# Patient Record
Sex: FEMALE | Race: WHITE | Employment: UNEMPLOYED | URBAN - METROPOLITAN AREA
[De-identification: names, ages, dates, MRNs, and addresses within clinical notes are randomized per-mention and may not be internally consistent; named-entity substitution may affect disease eponyms.]

---

## 2017-02-13 ENCOUNTER — HOSPITAL ENCOUNTER (OUTPATIENT)
Dept: ENDOSCOPY | Age: 67
Discharge: HOME | End: 2017-02-13
Attending: INTERNAL MEDICINE | Admitting: INTERNAL MEDICINE

## 2020-11-01 ENCOUNTER — HOSPITAL ENCOUNTER (EMERGENCY)
Age: 70
Discharge: HOME | End: 2020-11-01
Attending: EMERGENCY MEDICINE
Payer: MEDICARE

## 2020-11-01 ENCOUNTER — APPOINTMENT (OUTPATIENT)
Dept: CT IMAGING | Age: 70
End: 2020-11-01
Payer: MEDICARE

## 2020-11-01 VITALS
DIASTOLIC BLOOD PRESSURE: 49 MMHG | WEIGHT: 134 LBS | TEMPERATURE: 98.5 F | OXYGEN SATURATION: 97 % | RESPIRATION RATE: 20 BRPM | HEART RATE: 73 BPM | SYSTOLIC BLOOD PRESSURE: 115 MMHG | BODY MASS INDEX: 21.03 KG/M2 | HEIGHT: 67 IN

## 2020-11-01 LAB
A/G RATIO: 1.9 (ref 1.1–2.2)
ALBUMIN SERPL-MCNC: 4.5 G/DL (ref 3.4–5)
ALP BLD-CCNC: 81 U/L (ref 40–129)
ALT SERPL-CCNC: 11 U/L (ref 10–40)
ANION GAP SERPL CALCULATED.3IONS-SCNC: 12 MMOL/L (ref 3–16)
AST SERPL-CCNC: 19 U/L (ref 15–37)
BASOPHILS ABSOLUTE: 0 K/UL (ref 0–0.2)
BASOPHILS RELATIVE PERCENT: 0.6 %
BILIRUB SERPL-MCNC: 0.3 MG/DL (ref 0–1)
BILIRUBIN URINE: NEGATIVE
BLOOD, URINE: ABNORMAL
BUN BLDV-MCNC: 11 MG/DL (ref 7–20)
CALCIUM SERPL-MCNC: 9.6 MG/DL (ref 8.3–10.6)
CHLORIDE BLD-SCNC: 106 MMOL/L (ref 99–110)
CLARITY: CLEAR
CO2: 22 MMOL/L (ref 21–32)
COLOR: YELLOW
CREAT SERPL-MCNC: <0.5 MG/DL (ref 0.6–1.2)
EOSINOPHILS ABSOLUTE: 0 K/UL (ref 0–0.6)
EOSINOPHILS RELATIVE PERCENT: 0.2 %
EPITHELIAL CELLS, UA: 0 /HPF (ref 0–5)
GFR AFRICAN AMERICAN: >60
GFR NON-AFRICAN AMERICAN: >60
GLOBULIN: 2.4 G/DL
GLUCOSE BLD-MCNC: 121 MG/DL (ref 70–99)
GLUCOSE URINE: NEGATIVE MG/DL
HCT VFR BLD CALC: 40.3 % (ref 36–48)
HEMOGLOBIN: 13.5 G/DL (ref 12–16)
HYALINE CASTS: 1 /LPF (ref 0–8)
KETONES, URINE: ABNORMAL MG/DL
LEUKOCYTE ESTERASE, URINE: ABNORMAL
LIPASE: 43 U/L (ref 13–60)
LYMPHOCYTES ABSOLUTE: 0.5 K/UL (ref 1–5.1)
LYMPHOCYTES RELATIVE PERCENT: 12 %
MCH RBC QN AUTO: 33.1 PG (ref 26–34)
MCHC RBC AUTO-ENTMCNC: 33.4 G/DL (ref 31–36)
MCV RBC AUTO: 98.9 FL (ref 80–100)
MICROSCOPIC EXAMINATION: YES
MONOCYTES ABSOLUTE: 0.3 K/UL (ref 0–1.3)
MONOCYTES RELATIVE PERCENT: 5.8 %
NEUTROPHILS ABSOLUTE: 3.7 K/UL (ref 1.7–7.7)
NEUTROPHILS RELATIVE PERCENT: 81.4 %
NITRITE, URINE: NEGATIVE
PDW BLD-RTO: 13 % (ref 12.4–15.4)
PH UA: 5.5 (ref 5–8)
PLATELET # BLD: 140 K/UL (ref 135–450)
PMV BLD AUTO: 7.5 FL (ref 5–10.5)
POTASSIUM REFLEX MAGNESIUM: 4.1 MMOL/L (ref 3.5–5.1)
PROTEIN UA: NEGATIVE MG/DL
RBC # BLD: 4.07 M/UL (ref 4–5.2)
RBC UA: 2 /HPF (ref 0–4)
SODIUM BLD-SCNC: 140 MMOL/L (ref 136–145)
SPECIFIC GRAVITY UA: 1.01 (ref 1–1.03)
TOTAL PROTEIN: 6.9 G/DL (ref 6.4–8.2)
TROPONIN: <0.01 NG/ML
URINE REFLEX TO CULTURE: ABNORMAL
URINE TYPE: ABNORMAL
UROBILINOGEN, URINE: 0.2 E.U./DL
WBC # BLD: 4.5 K/UL (ref 4–11)
WBC UA: 3 /HPF (ref 0–5)

## 2020-11-01 PROCEDURE — 6360000002 HC RX W HCPCS: Performed by: NURSE PRACTITIONER

## 2020-11-01 PROCEDURE — 99284 EMERGENCY DEPT VISIT MOD MDM: CPT

## 2020-11-01 PROCEDURE — 2580000003 HC RX 258: Performed by: NURSE PRACTITIONER

## 2020-11-01 PROCEDURE — 93005 ELECTROCARDIOGRAM TRACING: CPT | Performed by: EMERGENCY MEDICINE

## 2020-11-01 PROCEDURE — 80053 COMPREHEN METABOLIC PANEL: CPT

## 2020-11-01 PROCEDURE — 85025 COMPLETE CBC W/AUTO DIFF WBC: CPT

## 2020-11-01 PROCEDURE — 6370000000 HC RX 637 (ALT 250 FOR IP): Performed by: NURSE PRACTITIONER

## 2020-11-01 PROCEDURE — 81001 URINALYSIS AUTO W/SCOPE: CPT

## 2020-11-01 PROCEDURE — 96376 TX/PRO/DX INJ SAME DRUG ADON: CPT

## 2020-11-01 PROCEDURE — 96374 THER/PROPH/DIAG INJ IV PUSH: CPT

## 2020-11-01 PROCEDURE — 6360000004 HC RX CONTRAST MEDICATION: Performed by: NURSE PRACTITIONER

## 2020-11-01 PROCEDURE — 74177 CT ABD & PELVIS W/CONTRAST: CPT

## 2020-11-01 PROCEDURE — 83690 ASSAY OF LIPASE: CPT

## 2020-11-01 PROCEDURE — 96375 TX/PRO/DX INJ NEW DRUG ADDON: CPT

## 2020-11-01 PROCEDURE — 96372 THER/PROPH/DIAG INJ SC/IM: CPT

## 2020-11-01 PROCEDURE — 84484 ASSAY OF TROPONIN QUANT: CPT

## 2020-11-01 RX ORDER — DICYCLOMINE HYDROCHLORIDE 10 MG/1
10 CAPSULE ORAL
Qty: 20 CAPSULE | Refills: 0 | Status: SHIPPED | OUTPATIENT
Start: 2020-11-01 | End: 2020-11-06

## 2020-11-01 RX ORDER — PROMETHAZINE HYDROCHLORIDE 25 MG/ML
12.5 INJECTION, SOLUTION INTRAMUSCULAR; INTRAVENOUS ONCE
Status: COMPLETED | OUTPATIENT
Start: 2020-11-01 | End: 2020-11-01

## 2020-11-01 RX ORDER — POLYETHYLENE GLYCOL 3350 17 G/17G
17 POWDER, FOR SOLUTION ORAL DAILY
Qty: 510 G | Refills: 0 | Status: SHIPPED | OUTPATIENT
Start: 2020-11-01 | End: 2020-12-01

## 2020-11-01 RX ORDER — MORPHINE SULFATE 4 MG/ML
4 INJECTION, SOLUTION INTRAMUSCULAR; INTRAVENOUS ONCE
Status: COMPLETED | OUTPATIENT
Start: 2020-11-01 | End: 2020-11-01

## 2020-11-01 RX ORDER — ONDANSETRON 2 MG/ML
4 INJECTION INTRAMUSCULAR; INTRAVENOUS ONCE
Status: COMPLETED | OUTPATIENT
Start: 2020-11-01 | End: 2020-11-01

## 2020-11-01 RX ORDER — 0.9 % SODIUM CHLORIDE 0.9 %
1000 INTRAVENOUS SOLUTION INTRAVENOUS ONCE
Status: COMPLETED | OUTPATIENT
Start: 2020-11-01 | End: 2020-11-01

## 2020-11-01 RX ORDER — DICYCLOMINE HYDROCHLORIDE 10 MG/1
10 CAPSULE ORAL
Qty: 20 CAPSULE | Refills: 0 | Status: SHIPPED | OUTPATIENT
Start: 2020-11-01 | End: 2020-11-01 | Stop reason: CLARIF

## 2020-11-01 RX ORDER — MAGNESIUM CARB/ALUMINUM HYDROX 105-160MG
296 TABLET,CHEWABLE ORAL ONCE
Qty: 1 BOTTLE | Refills: 0 | Status: SHIPPED | OUTPATIENT
Start: 2020-11-01 | End: 2020-11-01

## 2020-11-01 RX ORDER — POLYETHYLENE GLYCOL 3350 17 G/17G
17 POWDER, FOR SOLUTION ORAL DAILY
Qty: 510 G | Refills: 0 | Status: SHIPPED | OUTPATIENT
Start: 2020-11-01 | End: 2020-11-01 | Stop reason: CLARIF

## 2020-11-01 RX ORDER — PROMETHAZINE HYDROCHLORIDE 25 MG/1
25 TABLET ORAL EVERY 6 HOURS PRN
Qty: 20 TABLET | Refills: 0 | Status: SHIPPED | OUTPATIENT
Start: 2020-11-01

## 2020-11-01 RX ADMIN — LIDOCAINE HYDROCHLORIDE: 20 SOLUTION ORAL; TOPICAL at 15:08

## 2020-11-01 RX ADMIN — MORPHINE SULFATE 4 MG: 4 INJECTION INTRAVENOUS at 15:14

## 2020-11-01 RX ADMIN — ONDANSETRON 4 MG: 2 INJECTION INTRAMUSCULAR; INTRAVENOUS at 17:23

## 2020-11-01 RX ADMIN — PROMETHAZINE HYDROCHLORIDE 12.5 MG: 25 INJECTION INTRAMUSCULAR; INTRAVENOUS at 18:11

## 2020-11-01 RX ADMIN — ONDANSETRON 4 MG: 2 INJECTION INTRAMUSCULAR; INTRAVENOUS at 15:13

## 2020-11-01 RX ADMIN — SODIUM CHLORIDE 1000 ML: 9 INJECTION, SOLUTION INTRAVENOUS at 15:13

## 2020-11-01 ASSESSMENT — PAIN SCALES - GENERAL
PAINLEVEL_OUTOF10: 8
PAINLEVEL_OUTOF10: 8

## 2020-11-01 NOTE — ED PROVIDER NOTES
[address] [facility]        Pt Name: Martin Strickland  MRN: [id number]  [place] 1950  Date of evaluation: 11/1/2020  Provider: LEILA Porter - CNP  PCP: Nathalie Laurent MD     I have seen and evaluated this patient with my supervising physician. Dr. Nancy Crockett       Chief Complaint   Patient presents with    Abdominal Pain     pt in with c/o abd pain mid line- began four days ago- no n/v- some diarhhea- history of hernia, ulcer, colon cancer       HISTORY OF PRESENT ILLNESS   (Location, Timing/Onset, Context/Setting, Quality, Duration, Modifying Factors, Severity, Associated Signs and Symptoms)  Note limiting factors. Martin Strickland is a 71 y.o. female medical history of GERD, hiatal hernia and colon cancer presents the ED with complaints of periumbilical abdominal pain that has been a constant sharp burning pain since 6 AM this morning. Patient said the pain awoke her from her sleep and has not gone away. She was concerned as she took a Protonix as she thought was possibly related to her GERD. Said the GERD usually does not last this long and is not this severe. She did note a few episodes of diarrhea over the past 3 to 4 days with nausea and decreased p.o. intake. Said she felt like she had to force herself to eat a piece of toast this morning and has not ate anything else. She did note some urinary frequency and urgency, although denies dysuria and hematuria. She has a history of colon cancer approximately 40 years ago and had surgery and is not currently getting any chemo or radiation. She does note to have routine colonoscopies and had the last one in July 2020 and was noted with some diverticulosis but no concern for malignancy. She denies any associated fevers, cough, chest pain, vomiting, weakness, lightheaded, dizzy, leg swelling or headaches.     Nursing Notes were all reviewed and agreed with or any disagreements were addressed in the HPI. REVIEW OF SYSTEMS    (2-9 systems for level 4, 10 or more for level 5)     Review of Systems    Positives and Pertinent negatives as per HPI. Except as noted above in the ROS, all other systems were reviewed and negative. PAST MEDICAL HISTORY   History reviewed. No pertinent past medical history. SURGICAL HISTORY   History reviewed. No pertinent surgical history. [address]       Discharge Medication List as of 11/1/2020  8:08 PM      CONTINUE these medications which have NOT CHANGED    Details   acetaminophen (TYLENOL) 500 MG tablet Take 500 mg by mouth every 6 hours as needed for Pain      vitamin D (CHOLECALCIFEROL) 1000 UNIT TABS tablet Take 1,000 Units by mouth daily      cetirizine (ZYRTEC) 10 MG tablet Take 10 mg by mouth daily      methylPREDNISolone (MEDROL DOSEPACK) 4 MG tablet Take as directed, Disp-21 tablet,R-0Normal               ALLERGIES     Ciprofloxacin; Oxycodone-acetaminophen; Influenza a (h1n1) monovalent vaccine; Risedronate; and Sulfa antibiotics    FAMILYHISTORY     History reviewed. No pertinent family history. SOCIAL HISTORY       Social History     Tobacco Use    Smoking status: Never Smoker    Smokeless tobacco: Never Used   Substance Use Topics    Alcohol use: Not Currently     Alcohol/week: 0.0 standard drinks    Drug use: Not Currently       SCREENINGS             PHYSICAL EXAM    (up to 7 for level 4, 8 or more for level 5)     ED Triage Vitals [11/01/20 1238]   BP Temp Temp Source Pulse Resp SpO2 Height Weight   (!) 122/54 98.5 °F (36.9 °C) Oral 89 12 98 % 5' 7\" (1.702 m) 134 lb (60.8 kg)       Physical Exam  Vitals signs and nursing note reviewed. Constitutional:       General: She is awake. Appearance: Normal appearance. She is well-developed and normal weight. HENT:      Head: Normocephalic and atraumatic.       Nose: Nose normal.   Eyes:      General:   [address],  [place], [place] [facility]   Phone [phone]   TROPONIN    Narrative:     Performed at:  [facility]  [address] [place]   Phone [phone]   MICROSCOPIC URINALYSIS    Narrative:     Performed at:  [facility]  [address] [facility],  [place], [place]   Phone [phone]       All other labs were within normal range or not returned as of this dictation. EKG: All EKG's are interpreted by the Emergency Department Physician in the absence of a cardiologist.  Please see their note for interpretation of EKG. RADIOLOGY:   Non-plain film images such as CT, Ultrasound and MRI are read by the radiologist. Plain radiographic images are visualized and preliminarily interpreted by the ED Provider with the below findings:        Interpretation per the Radiologist below, if available at the time of this note:    CT ABDOMEN PELVIS W IV CONTRAST Additional Contrast? None   Final Result   1. No acute findings within the abdomen or pelvis. No evidence of small   bowel obstruction. Gas and stool throughout the colon with no acute   inflammatory changes. 2. 1.2 x 2.0 cm right hepatic lesion, likely a cavernous hemangioma. If   outside comparison imaging is available, this would be helpful to determine   stability. An addendum can be added at that time. 3. Nonobstructive bilateral nephrolithiasis. No results found.         PROCEDURES   Unless otherwise noted below, none     Procedures    CRITICAL CARE TIME   N/A    CONSULTS:  None      EMERGENCY DEPARTMENT COURSE and DIFFERENTIAL DIAGNOSIS/MDM:   Vitals:    Vitals:    11/01/20 1915 11/01/20 1930 11/01/20 1945 11/01/20 2000   BP:  (!) 119/55  (!) 115/49   Pulse: 74 72 71 73   Resp:    20   Temp:       TempSrc:       SpO2: 98% 97% 100% 97%   Weight:       Height:           Patient was given the following medications:  Medications   0.9 % sodium chloride bolus (0 mLs Intravenous Stopped 11/1/20 1724)   ondansetron (ZOFRAN) injection 4 mg (4 mg Intravenous Given 11/1/20 1513)   morphine injection 4 mg (4 mg Intravenous Given 11/1/20 1514)   aluminum & magnesium hydroxide-simethicone (MAALOX) 30 mL, lidocaine viscous hcl (XYLOCAINE) 5 mL (GI COCKTAIL) ( Oral Given 11/1/20 1508)   ondansetron (ZOFRAN) injection 4 mg (4 mg Intravenous Given 11/1/20 1723)   promethazine (PHENERGAN) injection 12.5 mg (12.5 mg Intramuscular Given 11/1/20 1811)             Pertinent Labs & Imaging studies reviewed. (See chart for details)   -  Patient seen and evaluated in the emergency department. -  Triage and nursing notes reviewed and incorporated. -  Old chart records reviewed and incorporated. -  Patient case discussed with attending physician, Dr. Merlyn Bolivar. They saw and examined patient. -  Differential diagnosis includes:  Diverticulitis, SBO, gastritis, UTI, dehydration, mesenteric adenitis, cholecystitis, esophagitis, vs COVID-19  -  Work-up included:  See above EKG, CTM pelvis, CBC, CMP, lipase, troponin, UA  -  ED treatment included:   Phenergan, Zofran, normal saline, morphine, GI cocktail  - Consults: None  -  Results discussed with patient. Labs show  UA shows trace ketones, small blood, trace leukocytes. CBC is unremarkable. CMP with glucose 121. Lipase 43. Troponin negative. CT abdomen pelvis shows no acute findings within the abdomen or pelvis. No evidence of small bowel obstruction. Gas and stool throughout the colon with no acute inflammatory changes. There is noted a 1.2 x 2 cm right hepatic lesion likely a cavernous hemangioma. CT scan from [facility] 2017 does confirm similar findings. Patient was informed on these results. Said that she has been taking MiraLAX occasionally, however had not been taking it on a daily basis. She did note having 5 bowel movements 2 days ago and did not think that she was possibly constipated.  Pt was given strict return precautions. The patient is agreeable with plan of care and disposition.  -  Disposition: Home      FINAL IMPRESSION      1. Periumbilical abdominal cramping    2. Constipation, unspecified constipation type    3. Hepatic lesion    4.  Uterine leiomyoma, unspecified location          DISPOSITION/PLAN   DISPOSITION        PATIENT REFERREDTO:  Chaitanya Ramírez [phone] [address] [address]    Call in 2 days  As needed, If symptoms worsen    [facility] Emergency Department  [address] [facility]  [phone]  Go to   As needed      DISCHARGE MEDICATIONS:  Discharge Medication List as of 11/1/2020  8:08 PM      START taking these medications    Details   dicyclomine (BENTYL) 10 MG capsule Take 1 capsule by mouth 4 times daily (before meals and nightly) for 20 doses, Disp-20 capsule,R-0Print      promethazine (PHENERGAN) 25 MG tablet Take 1 tablet by mouth every 6 hours as needed for Nausea, Disp-20 tablet,R-0Print      polyethylene glycol (GLYCOLAX) 17 GM/SCOOP powder Take 17 g by mouth daily, Disp-510 g,R-0Print      Magnesium Citrate 1.745 GM/30ML solution Take 296 mLs by mouth once for 1 dose, Disp-1 Bottle,R-0Print             DISCONTINUED MEDICATIONS:  Discharge Medication List as of 11/1/2020  8:08 PM                 (Please note that portions of this note were completed with a voice recognition program.  Efforts were made to edit the dictations but occasionally words are mis-transcribed.)    LEILA Bryant CNP (electronically signed)            LEILA Bryant CNP  11/02/20 4107

## 2020-11-02 LAB
EKG ATRIAL RATE: 75 BPM
EKG DIAGNOSIS: NORMAL
EKG P AXIS: 69 DEGREES
EKG P-R INTERVAL: 136 MS
EKG Q-T INTERVAL: 410 MS
EKG QRS DURATION: 92 MS
EKG QTC CALCULATION (BAZETT): 457 MS
EKG R AXIS: -23 DEGREES
EKG T AXIS: 49 DEGREES
EKG VENTRICULAR RATE: 75 BPM

## 2020-11-02 PROCEDURE — 93010 ELECTROCARDIOGRAM REPORT: CPT | Performed by: INTERNAL MEDICINE

## 2020-11-02 NOTE — ED NOTES
Pt attempting to drink 2 glasses of water - no vomiting noted. Resting at present. Call bell in reach.       Kt Buckley RN  11/01/20 1935

## 2020-11-02 NOTE — ED NOTES
Pt tolerated po fluids. No nausea or vomiting noted. DC instructions given- DC home with family. No complaints voiced.      Kristy Espinal RN  11/01/20 2021

## 2020-11-03 NOTE — ED PROVIDER NOTES
I independently performed a history and physical on Joseph Hernandez. All diagnostic, treatment, and disposition decisions were made by myself in conjunction with the advanced practice provider. Briefly, this is a 71 y.o. female here for crampy lower abdominal pain. Associated with nausea which patient states is chronic issue for her form hiatal hernia however worsened today. Denies any vomiting. Symptoms improved modestly after bowel movements. .    On exam, Patient afebrile and nontoxic. No distress. Heart RRR. Lungs CTAB. Abdomen soft, nondistended, tender to palpation in LLQ and inferior periumbilical region. No focal RLQ tenderness or Rovsing. No rebound, no rigidity, no guarding. EKG  EKG was reviewed by emergency department physician in the absence of a cardiologist    Narrow complex sinus rhythm, rate 75, normal axis, normal ME and QRS intervals, normal Qtc, no ST elevations or depressions, normal t-wave morphology, impression NSR, no STEMI, no comparison available      Screenings            MDM    Patient afebrile and nontoxic. No distress. No peritoneal signs on abdominal exam. No focal findings to suggest acute appendicitis. Cardiac workup reassuring, low suspicion for ACS as cause of lower abdominal pain. CT imaging without evidence of obstruction, perforation or other acute process. There is a hepatic lesion on CT, possibly cavernous hemangioma and also present on imaging from 2017 Patient does appear to have significant stool burden which is likely contributing to her pain. No impaction. Was able to tolerate PO in the ED. [place] safe for discharge to self care and will trial bowel regimen. Counseled on importance of close PCP follow up for her abdominal pain and liver lesion within the next couple days. Strict return precautions were discussed.      Patient Referrals:  Chaitanya Ramírez [phone] [employer] [address]    Call in 2 days  As needed, If symptoms worsen    [facility] [address] Emergency Department  [facility] [address]  [phone]  Go to   As needed      Discharge Medications:  Discharge Medication List as of 11/1/2020  8:08 PM      START taking these medications    Details   dicyclomine (BENTYL) 10 MG capsule Take 1 capsule by mouth 4 times daily (before meals and nightly) for 20 doses, Disp-20 capsule,R-0Print      promethazine (PHENERGAN) 25 MG tablet Take 1 tablet by mouth every 6 hours as needed for Nausea, Disp-20 tablet,R-0Print      polyethylene glycol (GLYCOLAX) 17 GM/SCOOP powder Take 17 g by mouth daily, Disp-510 g,R-0Print      Magnesium Citrate 1.745 GM/30ML solution Take 296 mLs by mouth once for 1 dose, Disp-1 Bottle,R-0Print             FINAL IMPRESSION  1. Periumbilical abdominal cramping    2. Constipation, unspecified constipation type    3. Hepatic lesion    4. Uterine leiomyoma, unspecified location        Blood pressure (!) 115/49, pulse 73, temperature 98.5 °F (36.9 °C), temperature source Oral, resp. rate 20, height 5' 7\" (1.702 m), weight 134 lb (60.8 kg), SpO2 97 %. For further details of Fariba Clay's emergency department encounter, please see documentation by advanced practice provider, Pola Jaime NP.     Ady Tillamn DO (electronically signed)  Attending Emergency Physician       Ady Tillman DO  11/03/20 2902

## 2023-10-25 RX ORDER — CALCIUM CARBONATE 300MG(750)
400 TABLET,CHEWABLE ORAL DAILY
COMMUNITY

## 2023-10-25 RX ORDER — ASCORBIC ACID 250 MG
500 TABLET ORAL DAILY
COMMUNITY

## 2023-10-25 RX ORDER — VIT C/B6/B5/MAGNESIUM/HERB 173 50-5-6-5MG
500 CAPSULE ORAL DAILY
COMMUNITY

## 2023-10-25 NOTE — PROGRESS NOTES
[facility] ENDOSCOPY COLONOSCOPY/EGD PRE-OPERATIVE INSTRUCTIONS    Procedure date__10/31/2023_______  Arrival time____0630________          Surgery time___0730_________       Clear liquids the day before the procedure. Do not eat or drink anything within 5 hours of your procedure. This includes water chewing gum, mints and ice chips. You may brush your teeth and gargle the morning of your surgery, but do not swallow the water    You may be asked to stop blood thinners such as Coumadin, Plavix, Fragmin, Lovenox, etc., or any anti-inflammatories such as:  Aspirin, Ibuprofen, Advil, Naproxen prior to your procedure. We also ask that you stop any OTC medications such as fish oil, vitamin E, glucosamine, garlic, Multivitamins, COQ 10, etc.    You must make arrangements for a responsible adult to arrive with you and stay in our waiting area during your procedure. They will also need to take you home after your procedure. For your safety you will not be allowed to leave alone or drive yourself home. Also for your safety, it is strongly suggested that someone stay with you the first 24 hours after your procedure. For your comfort, please wear simple loose fitting clothing to the center. Please do not bring valuables. If you have a living will and a durable power of  for healthcare, please bring in a copy.      You will need to bring a photo ID and insurance card    Our goal is to provide you with excellent care so if you have any questions, please contact us at the [facility] at [phone]         Please note these are generalized instructions for all colonoscopy cases, you may be provided with more specific instructions if necessary

## 2023-10-30 ENCOUNTER — ANESTHESIA EVENT (OUTPATIENT)
Dept: ENDOSCOPY | Age: 73
End: 2023-10-30
Payer: MEDICARE

## 2023-10-31 ENCOUNTER — ANESTHESIA (OUTPATIENT)
Dept: ENDOSCOPY | Age: 73
End: 2023-10-31
Payer: MEDICARE

## 2023-10-31 ENCOUNTER — ANESTHESIA EVENT (OUTPATIENT)
Dept: ENDOSCOPY | Age: 73
End: 2023-10-31
Payer: MEDICARE

## 2023-10-31 ENCOUNTER — HOSPITAL ENCOUNTER (OUTPATIENT)
Age: 73
Setting detail: OUTPATIENT SURGERY
Discharge: HOME | End: 2023-10-31
Attending: INTERNAL MEDICINE | Admitting: INTERNAL MEDICINE
Payer: MEDICARE

## 2023-10-31 VITALS
OXYGEN SATURATION: 99 % | WEIGHT: 125 LBS | TEMPERATURE: 98.1 F | RESPIRATION RATE: 16 BRPM | BODY MASS INDEX: 20.09 KG/M2 | HEART RATE: 73 BPM | HEIGHT: 66 IN | SYSTOLIC BLOOD PRESSURE: 130 MMHG | DIASTOLIC BLOOD PRESSURE: 42 MMHG

## 2023-10-31 DIAGNOSIS — R19.5 POSITIVE FIT (FECAL IMMUNOCHEMICAL TEST): ICD-10-CM

## 2023-10-31 DIAGNOSIS — K21.9 GASTROESOPHAGEAL REFLUX DISEASE, UNSPECIFIED WHETHER ESOPHAGITIS PRESENT: ICD-10-CM

## 2023-10-31 DIAGNOSIS — Z85.038 HISTORY OF COLON CANCER: ICD-10-CM

## 2023-10-31 PROCEDURE — 3609017100 HC EGD: Performed by: INTERNAL MEDICINE

## 2023-10-31 PROCEDURE — 7100000010 HC PHASE II RECOVERY - FIRST 15 MIN: Performed by: INTERNAL MEDICINE

## 2023-10-31 PROCEDURE — 2709999900 HC NON-CHARGEABLE SUPPLY: Performed by: INTERNAL MEDICINE

## 2023-10-31 PROCEDURE — 88305 TISSUE EXAM BY PATHOLOGIST: CPT

## 2023-10-31 PROCEDURE — 7100000011 HC PHASE II RECOVERY - ADDTL 15 MIN: Performed by: INTERNAL MEDICINE

## 2023-10-31 PROCEDURE — 2580000003 HC RX 258: Performed by: ANESTHESIOLOGY

## 2023-10-31 PROCEDURE — 2500000003 HC RX 250 WO HCPCS: Performed by: NURSE ANESTHETIST, CERTIFIED REGISTERED

## 2023-10-31 PROCEDURE — 3700000001 HC ADD 15 MINUTES (ANESTHESIA): Performed by: INTERNAL MEDICINE

## 2023-10-31 PROCEDURE — 3700000000 HC ANESTHESIA ATTENDED CARE: Performed by: INTERNAL MEDICINE

## 2023-10-31 PROCEDURE — 3609010600 HC COLONOSCOPY POLYPECTOMY SNARE/COLD BIOPSY: Performed by: INTERNAL MEDICINE

## 2023-10-31 PROCEDURE — 6360000002 HC RX W HCPCS: Performed by: NURSE ANESTHETIST, CERTIFIED REGISTERED

## 2023-10-31 RX ORDER — PROPOFOL 10 MG/ML
INJECTION, EMULSION INTRAVENOUS CONTINUOUS PRN
Status: DISCONTINUED | OUTPATIENT
Start: 2023-10-31 | End: 2023-10-31 | Stop reason: SDUPTHER

## 2023-10-31 RX ORDER — SODIUM CHLORIDE 9 MG/ML
INJECTION, SOLUTION INTRAVENOUS PRN
Status: DISCONTINUED | OUTPATIENT
Start: 2023-10-31 | End: 2023-10-31 | Stop reason: HOSPADM

## 2023-10-31 RX ORDER — ONDANSETRON 2 MG/ML
4 INJECTION INTRAMUSCULAR; INTRAVENOUS
Status: CANCELLED | OUTPATIENT
Start: 2023-10-31 | End: 2023-11-01

## 2023-10-31 RX ORDER — SODIUM CHLORIDE 0.9 % (FLUSH) 0.9 %
5-40 SYRINGE (ML) INJECTION PRN
Status: CANCELLED | OUTPATIENT
Start: 2023-10-31

## 2023-10-31 RX ORDER — SODIUM CHLORIDE 0.9 % (FLUSH) 0.9 %
5-40 SYRINGE (ML) INJECTION PRN
Status: DISCONTINUED | OUTPATIENT
Start: 2023-10-31 | End: 2023-10-31 | Stop reason: HOSPADM

## 2023-10-31 RX ORDER — DIPHENHYDRAMINE HYDROCHLORIDE 50 MG/ML
12.5 INJECTION INTRAMUSCULAR; INTRAVENOUS
Status: CANCELLED | OUTPATIENT
Start: 2023-10-31 | End: 2023-11-01

## 2023-10-31 RX ORDER — SODIUM CHLORIDE 0.9 % (FLUSH) 0.9 %
5-40 SYRINGE (ML) INJECTION EVERY 12 HOURS SCHEDULED
Status: DISCONTINUED | OUTPATIENT
Start: 2023-10-31 | End: 2023-10-31 | Stop reason: HOSPADM

## 2023-10-31 RX ORDER — SODIUM CHLORIDE 9 MG/ML
INJECTION, SOLUTION INTRAVENOUS PRN
Status: CANCELLED | OUTPATIENT
Start: 2023-10-31

## 2023-10-31 RX ORDER — SODIUM CHLORIDE 0.9 % (FLUSH) 0.9 %
5-40 SYRINGE (ML) INJECTION EVERY 12 HOURS SCHEDULED
Status: CANCELLED | OUTPATIENT
Start: 2023-10-31

## 2023-10-31 RX ORDER — LIDOCAINE HYDROCHLORIDE 20 MG/ML
INJECTION, SOLUTION EPIDURAL; INFILTRATION; INTRACAUDAL; PERINEURAL PRN
Status: DISCONTINUED | OUTPATIENT
Start: 2023-10-31 | End: 2023-10-31 | Stop reason: SDUPTHER

## 2023-10-31 RX ADMIN — SODIUM CHLORIDE: 9 INJECTION, SOLUTION INTRAVENOUS at 07:14

## 2023-10-31 RX ADMIN — LIDOCAINE HYDROCHLORIDE 50 MG: 20 INJECTION, SOLUTION EPIDURAL; INFILTRATION; INTRACAUDAL; PERINEURAL at 07:40

## 2023-10-31 RX ADMIN — PROPOFOL 120 MCG/KG/MIN: 10 INJECTION, EMULSION INTRAVENOUS at 07:40

## 2023-10-31 ASSESSMENT — PAIN SCALES - GENERAL
PAINLEVEL_OUTOF10: 0
PAINLEVEL_OUTOF10: 0

## 2023-10-31 ASSESSMENT — PAIN - FUNCTIONAL ASSESSMENT: PAIN_FUNCTIONAL_ASSESSMENT: NONE - DENIES PAIN

## 2023-10-31 ASSESSMENT — LIFESTYLE VARIABLES: SMOKING_STATUS: 0

## 2023-10-31 NOTE — ANESTHESIA POSTPROCEDURE EVALUATION
Department of Anesthesiology  Postprocedure Note    Patient: Heidi Mosqueda  MRN: [id number]  [address] [facility] [place]: 1950  Date of evaluation: 10/31/2023      Procedure Summary       Date: 10/31/23 Room / Location: [address] [place] [address]    Anesthesia Start: 2837 Anesthesia Stop: 0805    Procedures:       COLONOSCOPY POLYPECTOMY SNARE/COLD BIOPSY      EGD ESOPHAGOGASTRODUODENOSCOPY Diagnosis:       History of colon cancer      Positive FIT (fecal immunochemical test)      Gastroesophageal reflux disease, unspecified whether esophagitis present      (History of colon cancer [Z85.038])      (Positive FIT (fecal immunochemical test) [R19.5])      (Gastroesophageal reflux disease, unspecified whether esophagitis present [K21.9])    Surgeons: Trina Martins MD Responsible Provider: Markus Pollack MD    Anesthesia Type: MAC ASA Status: 2            Anesthesia Type: No value filed.     Carlos Phase I: Carlos Score: 10    Carlos Phase II: Carlos Score: 10      Anesthesia Post Evaluation    Patient location during evaluation: bedside  Patient participation: complete - patient participated  Level of consciousness: awake and alert  Pain score: 0  Nausea & Vomiting: no nausea  Complications: no  Cardiovascular status: hemodynamically stable  Respiratory status: acceptable  Hydration status: stable  Pain management: adequate

## 2023-10-31 NOTE — PROGRESS NOTES
RN talked to Renny SANDOVAL about scheduling patient for next day. Patient instructed to stay on clear liquid and begin prep this after noon . Patient and  verbalized understanding.

## 2023-10-31 NOTE — DISCHARGE INSTRUCTIONS
Discharge Instructions for Colonoscopy     Colonoscopy is a visual exam of the lining of the large intestine, also called the bowel or colon, with a colonoscope. A colonoscope is a flexible tube with a light and a viewing device. It allows the doctor to view the inside of the colon through a tiny video camera. Colonoscopy is performed for many reasons: unexplained anemia , pain, diarrhea , bloody stools, cancer screening, among many other reasons. Complications from a colonoscopy are rare. Some possible serious complications include perforated bowel (which might require surgery) and bleeding (which could require blood transfusion ). Minor complications include bloating, gas, and cramping that can last for 1-2 days after the procedure. Because air is put into your colon during the procedure, it is normal to pass large amounts of air from your rectum. You may not have a bowel movement for 1-3 days after the procedure. What You Will Need:  Someone to drive you home after the procedure     Steps to Take:  [address] when you get home. Because the sedative will make you drowsy, don't drive, operate machinery, or make important decisions the day of the procedure. Feelings of bloating, gas, or cramping may persist for 24 hours. Diet -  Try sips of water first. If tolerated, resume bland food (scrambled eggs, toast, soup) first.  If tolerated, resume regular diet or the diet recommended by your physician. Do not drink alcohol for 24 hours. Physical Activity -  Ask your doctor when you will be able to return to work. Do not drive, operate heavy machinery, or do activities that require coordination or balance for 24 hours. Otherwise, return to your normal routine as soon as you are comfortable to do so, which is usually the next day after the procedure. Medications - When taking medications, it's important to:    Take your medication as directed, not more, not less, not at a different

## 2023-10-31 NOTE — OP NOTE
EGD PROCEDURE NOTE         Esophagogastroduodenoscopy Procedure Note     Patient: Monica Pompa MRN: [id number]   YOB: 1950 Age: 67 y.o. Sex: female       Admitting Physician: Nick Rodrigez     Primary Care Physician: Naldo Hernandez MD      DATE OF PROCEDURE: 10/31/2023  PROCEDURE: Esophagogastroduodenoscopy  INDICATION: This is a 67y.o. year old female who presents today with FIT positive. ENDOSCOPIST: Nick Rodrigez MD    POSTOPERATIVE DIAGNOSIS:    Negative Egd    PLAN:    Colonoscopy    INFORMED CONSENT:  Informed consent for esophagogastoduodenoscopy was obtained. The benefits and risks including adverse medicine reaction have been explained. The patient's questions were answered and the patient agreed to proceed. ASA:  ASA 2 - Patient with mild systemic disease with no functional limitations    SEDATION: MAC     The patient's vital signs, cardiac status, pulmonary status, abdominal status and mental status were stable for the procedure. The patient's vitals signs and respiratory function as monitored by oxygen saturation were stable throughout    Procedure Details: The Olympus videoendoscope was inserted into the mouth and carefully passed into the esophagus, through the stomach and to the distal duodenum. Antegrade and retrograde examination of the upper gi tract was carefully performed. Findings: The esophagus is normal.  The z-line is distinct without lesions or irregularities. There is no evidence of Lowry's esophagus. The scope easily passed into the stomach. The mucosa of the cardia, fundus, body and antrum of the stomach is normal.  The pylorus is patent and of normal contour. The scope easily advanced into the duodenal bulb and down to the distal duodenum.   Ante-and retrograde exam of the duodenum is normal.    Gastric or Duodenal ulcer present: No    Estimated Blood Loss: None      Signed By: Nick Rodrigez MD

## 2023-10-31 NOTE — FLOWSHEET NOTE
[facility] Pre-Admission Testing Electronic Communication Worksheet for OR/ENDO Procedures        Patient: Sue Stallings    DOS: 11/1/23    Arrival Time: 0900    Surgery Time:1030    Meds to Bed:  [] YES    [x]  NO    Transportation Confirmed: [x] YES    []  NO      History and Physical:  [] YES    [x]  NO  [] N/A  If yes, please list doctor or Urgent Care and date of H&P:     Additional Clearance(Cardiac, Pulmonary, etc):  [] YES    [x]  NO    Pre-Admission Testing Visit:  [] YES    [x]  NO If no, do labs/testing need to be done DOS? [] YES    [x]  NO    Medication Reconciliation Complete:  [x] YES    []  NO        Additional Notes:  Pt. Had colonoscopy at [address] [facility] on 10/31/23  Coming down to [facility] to attempt again- poor prep the first time. Dr. Hali Moyer gave new instructions with gatorade and miralax.                Interview Complete: [x] YES    []  NO          Adriano Harden RN  4:24 PM
such as:  Aspirin, Ibuprofen, Advil, Naproxen prior to your surgery. We also ask that you stop any OTC medications such as fish oil, vitamin E, glucosamine, garlic, Multivitamins, COQ 10, etc.    We ask that you do not smoke 24 hours prior to surgery  We ask that you do not  drink any alcoholic beverages 24 hours prior to surgery     You must make arrangements for a responsible adult to take you home after your surgery. For your safety you will not be allowed to leave alone or drive yourself home. Your surgery will be cancelled if you do not have a ride home. Also for your safety, it is strongly suggested that someone stay with you the first 24 hours after your surgery. Please do not bring other children with you. For your comfort, please wear simple loose fitting clothing to the hospital.  Please do not bring valuables. Do not wear any make-up or nail polish on your fingers or toes. For your safety, please do not wear any jewelry or body piercing's on the day of surgery. All jewelry must be removed. If you have dentures, they will be removed before going to operating room. For your convenience, we will provide you with a container. If you wear contact lenses or glasses, they will be removed, please bring a case for them. As part of our patient safety program to minimize surgical site infections, we ask you to do the following:    Please notify your surgeon if you develop any illness between         now and the day of your surgery. This includes a cough, cold, fever, sore throat, nausea,         or vomiting, and diarrhea, etc.   Please notify your surgeon if you experience dizziness, shortness         of breath or blurred vision between now and the time of your surgery. Do not shave your operative site 96 hours prior to surgery. For face and neck surgery, men may use an electric razor 48 hours   prior to surgery.     You may shower the night before surgery or the

## 2023-10-31 NOTE — OP NOTE
COLONOSCOPY     Patient: Sy Avendano MRN: [id number]   YOB: 1950 Age: 67 y.o. Sex: female       Admitting Physician: Shani Macias     Primary Care Physician: Reynaldo Garcia MD      DATE OF PROCEDURE: 10/31/2023  PROCEDURE: Colonoscopy    PREOPERATIVE DIAGNOSIS: Positive FIT. History of colon cancer. HPI: This is a 67y.o. year old female who presents today with positive FIT.    ENDOSCOPIST: Shani Macias MD    POSTOPERATIVE DIAGNOSIS:    Incomplete colonoscopy(complete to cecum) due to prep  Small AC polyp, cold snared and removed    PLAN:   Follow up biopsies; the patient to contact me for results  Repeat colonoscopy with better prep    INFORMED CONSENT:  Informed consent for colonoscopy was obtained. The benefits and risks including adverse medicine reaction and perforation have been explained. The patient's questions were answered and the patient agreed to proceed. ASA: ASA 2 - Patient with mild systemic disease with no functional limitations     SEDATION: MAC    The patient's vital signs, cardiac status, pulmonary status, abdominal status and mental status were stable for the procedure. The patient's vital signs and respiratory function as monitored by oxygen saturation remained stable. COLON PREPARATION:  The patient was given a split colon preparation and the preparation was inadequate. Procedure Details and Findings: An anal exam was performed and this was unremarkable. A digital rectal exam was performed and there is stool. The scope was introduced and passed to the cecum. There was large amounts of liquid and solid throughout and examination was not possible. There was a 6 mm sessile polyp in the ascending colon that was cold snared and removed.     Cecum Intubated: Yes    Estimated Blood Loss:  Minimal  Complications: None    Signed By: Shani Macias MD

## 2023-11-01 ENCOUNTER — ANESTHESIA (OUTPATIENT)
Dept: ENDOSCOPY | Age: 73
End: 2023-11-01
Payer: MEDICARE

## 2023-11-01 ENCOUNTER — HOSPITAL ENCOUNTER (OUTPATIENT)
Age: 73
Setting detail: OUTPATIENT SURGERY
Discharge: HOME | End: 2023-11-01
Attending: INTERNAL MEDICINE | Admitting: INTERNAL MEDICINE
Payer: MEDICARE

## 2023-11-01 VITALS
RESPIRATION RATE: 20 BRPM | TEMPERATURE: 98 F | SYSTOLIC BLOOD PRESSURE: 124 MMHG | HEART RATE: 68 BPM | DIASTOLIC BLOOD PRESSURE: 64 MMHG | OXYGEN SATURATION: 100 % | HEIGHT: 66 IN | WEIGHT: 125 LBS | BODY MASS INDEX: 20.09 KG/M2

## 2023-11-01 PROCEDURE — 2720000010 HC SURG SUPPLY STERILE: Performed by: INTERNAL MEDICINE

## 2023-11-01 PROCEDURE — 3609009900 HC COLONOSCOPY W/CONTROL BLEEDING ANY METHOD: Performed by: INTERNAL MEDICINE

## 2023-11-01 PROCEDURE — 7100000000 HC PACU RECOVERY - FIRST 15 MIN: Performed by: INTERNAL MEDICINE

## 2023-11-01 PROCEDURE — 7100000001 HC PACU RECOVERY - ADDTL 15 MIN: Performed by: INTERNAL MEDICINE

## 2023-11-01 PROCEDURE — 2709999900 HC NON-CHARGEABLE SUPPLY: Performed by: INTERNAL MEDICINE

## 2023-11-01 PROCEDURE — 6360000002 HC RX W HCPCS: Performed by: NURSE ANESTHETIST, CERTIFIED REGISTERED

## 2023-11-01 PROCEDURE — 7100000010 HC PHASE II RECOVERY - FIRST 15 MIN: Performed by: INTERNAL MEDICINE

## 2023-11-01 PROCEDURE — 7100000011 HC PHASE II RECOVERY - ADDTL 15 MIN: Performed by: INTERNAL MEDICINE

## 2023-11-01 PROCEDURE — 3700000000 HC ANESTHESIA ATTENDED CARE: Performed by: INTERNAL MEDICINE

## 2023-11-01 PROCEDURE — 2580000003 HC RX 258: Performed by: ANESTHESIOLOGY

## 2023-11-01 PROCEDURE — 2500000003 HC RX 250 WO HCPCS: Performed by: NURSE ANESTHETIST, CERTIFIED REGISTERED

## 2023-11-01 PROCEDURE — 3700000001 HC ADD 15 MINUTES (ANESTHESIA): Performed by: INTERNAL MEDICINE

## 2023-11-01 RX ORDER — SODIUM CHLORIDE 9 MG/ML
INJECTION, SOLUTION INTRAVENOUS PRN
Status: DISCONTINUED | OUTPATIENT
Start: 2023-11-01 | End: 2023-11-01 | Stop reason: HOSPADM

## 2023-11-01 RX ORDER — SODIUM CHLORIDE 0.9 % (FLUSH) 0.9 %
5-40 SYRINGE (ML) INJECTION PRN
Status: DISCONTINUED | OUTPATIENT
Start: 2023-11-01 | End: 2023-11-01 | Stop reason: HOSPADM

## 2023-11-01 RX ORDER — LIDOCAINE HYDROCHLORIDE 20 MG/ML
INJECTION, SOLUTION EPIDURAL; INFILTRATION; INTRACAUDAL; PERINEURAL PRN
Status: DISCONTINUED | OUTPATIENT
Start: 2023-11-01 | End: 2023-11-01 | Stop reason: SDUPTHER

## 2023-11-01 RX ORDER — SODIUM CHLORIDE 0.9 % (FLUSH) 0.9 %
5-40 SYRINGE (ML) INJECTION EVERY 12 HOURS SCHEDULED
Status: DISCONTINUED | OUTPATIENT
Start: 2023-11-01 | End: 2023-11-01 | Stop reason: HOSPADM

## 2023-11-01 RX ORDER — PROPOFOL 10 MG/ML
INJECTION, EMULSION INTRAVENOUS PRN
Status: DISCONTINUED | OUTPATIENT
Start: 2023-11-01 | End: 2023-11-01 | Stop reason: SDUPTHER

## 2023-11-01 RX ADMIN — LIDOCAINE HYDROCHLORIDE 60 MG: 20 INJECTION, SOLUTION EPIDURAL; INFILTRATION; INTRACAUDAL; PERINEURAL at 10:09

## 2023-11-01 RX ADMIN — PROPOFOL 80 MG: 10 INJECTION, EMULSION INTRAVENOUS at 10:09

## 2023-11-01 RX ADMIN — PROPOFOL 160 MCG/KG/MIN: 10 INJECTION, EMULSION INTRAVENOUS at 10:11

## 2023-11-01 RX ADMIN — SODIUM CHLORIDE: 9 INJECTION, SOLUTION INTRAVENOUS at 10:00

## 2023-11-01 ASSESSMENT — PAIN - FUNCTIONAL ASSESSMENT: PAIN_FUNCTIONAL_ASSESSMENT: 0-10

## 2023-11-01 ASSESSMENT — PAIN SCALES - GENERAL: PAINLEVEL_OUTOF10: 0

## 2023-11-01 ASSESSMENT — LIFESTYLE VARIABLES: SMOKING_STATUS: 0

## 2023-11-01 NOTE — PROGRESS NOTES
Pt received into [facility] from Endo. Pt drowsy, yet arousable. Vss. Pt stable. Report obtained. Pt denies pain. Will monitor.

## 2023-11-01 NOTE — ANESTHESIA POSTPROCEDURE EVALUATION
Department of Anesthesiology  Postprocedure Note    Patient: Sabrina Hoover  MRN: [id number]  [facility] [place] 1950  Date of evaluation: 11/1/2023      Procedure Summary       Date: 11/01/23 Room / Location: [address] [place] [address]    Anesthesia Start: 1007 Anesthesia Stop: 9320    Procedure: COLONOSCOPY CONTROL HEMORRHAGE Diagnosis:       History of colon cancer      Guaiac positive stools      (History of colon cancer [Z85.038])      (Guaiac positive stools [R19.5])    Surgeons: Mary Rosa MD Responsible Provider: Teto Yancey MD    Anesthesia Type: MAC ASA Status: 2            Anesthesia Type: No value filed.     Carlos Phase I: Carlos Score: 10    Carlos Phase II: Carlos Score: 10      Anesthesia Post Evaluation    Patient location during evaluation: bedside  Patient participation: complete - patient participated  Level of consciousness: awake and alert  Pain score: 0  Airway patency: patent  Nausea & Vomiting: no vomiting  Complications: no  Cardiovascular status: blood pressure returned to baseline  Respiratory status: acceptable  Hydration status: euvolemic  Pain management: adequate

## 2023-11-01 NOTE — OP NOTE
COLONOSCOPY     Patient: Paz Mcdowell MRN: [id number]   YOB: 1950 Age: 67 y.o. Sex: female       Admitting Physician: Gabriella Johnson     Primary Care Physician: Bernie Kothari MD      DATE OF PROCEDURE: 11/1/2023  PROCEDURE: Colonoscopy    PREOPERATIVE DIAGNOSIS:  Colon Cancer screening. History of colon cancer [Z85.038]  Guaiac positive stools [R19.5]  HPI: This is a 67y.o. year old female who presents today for colon cancer screening and screening colonoscopy. The patient has a history of colon cancer remotely diagnosed in the late 1980s. She has a history of polyps. She had a colonoscopy yesterday with removal of an ascending colon polyp but the prep was otherwise not adequate for screening. ENDOSCOPIST: Gabriella Johnson MD    POSTOPERATIVE DIAGNOSIS:    1. Ascending colon polypectomy site with clot,  2. Melanosis coli    PLAN:   1. Follow-up polyp biopsies; the patient to contact me in 1 week for results  2. Tentative surveillance colonoscopy in 5 years, health permitting    INFORMED CONSENT:  Informed consent for colonoscopy was obtained. The benefits and risks including adverse medicine reaction and perforation have been explained. The patient's questions were answered and the patient agreed to proceed. ASA: ASA 2 - Patient with mild systemic disease with no functional limitations     SEDATION: MAC    The patient's vital signs, cardiac status, pulmonary status, abdominal status and mental status were stable for the procedure. The patient's vital signs and respiratory function as monitored by oxygen saturation remained stable. COLON PREPARATION:  The patient was given a split colon preparation and the preparation was adequate. Procedure Details: An anal exam was performed and this was unremarkable. A digital rectal exam was performed and no masses palpated.  The Olympus videocolonoscope  was inserted in the rectum and carefully advanced to the cecum as

## 2023-11-02 NOTE — H&P
Gastroenterology Outpatient History and Physical     Patient: Jeanette Delacruz MRN: [id number] Sex: female   YOB: 1950 Age: 67 y.o. Location: [facility]    Date:11/2/2023  Primary Care Physician: Mert Ann MD         Patient: Jeanette Nubia    Physician: Susana Gabriel MD    History of Present Illness: FIT Positive  Review of Systems:  Weight Loss: No  Dysphagia: No  Dyspepsia: No  History:  Past Medical History:   Diagnosis Date    Age related osteoporosis     Colon cancer ([address])     in 20's.   bowel resection, radiation no chemo    Fibromyalgia     GERD (gastroesophageal reflux disease)     Hemangioma of liver     Neutropenia (HCC)     PONV (postoperative nausea and vomiting)     Stomach ulcer     Syncope       Past Surgical History:   Procedure Laterality Date    APPENDECTOMY      CATARACT EXTRACTION Bilateral 07/2023    COLECTOMY      in 20's    COLONOSCOPY      COLONOSCOPY N/A 10/31/2023    COLONOSCOPY POLYPECTOMY SNARE/COLD BIOPSY performed by Susana Gabriel MD at [facility] N/A 11/1/2023    COLONOSCOPY CONTROL HEMORRHAGE performed by Susana Gabriel MD at Replaced by [facility] [address] ENDOSCOPY N/A 10/31/2023    EGD ESOPHAGOGASTRODUODENOSCOPY performed by Susana Gabriel MD at [address]  History    Marital status:      Spouse name: None    Number of children: None    Years of education: None    Highest education level: None   Tobacco Use    Smoking status: Never    Smokeless tobacco: Never   Vaping Use    Vaping Use: Never used   Substance and Sexual Activity    Alcohol use: Not Currently     Alcohol/week: 0.0 standard drinks of alcohol    Drug use: Not Currently    Sexual activity: Defer      Family History   Problem Relation Age of Onset    Colon Cancer Paternal Aunt     Breast Cancer Maternal Grandmother     Cancer Paternal

## (undated) DEVICE — SNARE ENDOSCP POLYP 2.4 MM 240 CM 10 MM 2.8 MM CAPTIVATOR

## (undated) DEVICE — ENDOSCOPY KIT: Brand: MEDLINE INDUSTRIES, INC.

## (undated) DEVICE — REPLAY HEMOSTASIS CLIP, 16MM SPAN: Brand: REPLAY